# Patient Record
(demographics unavailable — no encounter records)

---

## 2025-03-16 NOTE — HISTORY OF PRESENT ILLNESS
[Current] : current [>= 20 pack years] : >= 20 pack years [TextBox_4] : 82-year-old female with history of OAD, presents for follow-up.  Patient complains of occasional productive cough without SOB, chest pain, hemoptysis, night sweats or weight loss.  She continues to smoke 1 pack daily.  She uses Breo daily with occasional albuterol MDI use. [TextBox_11] : 1 [TextBox_13] : 41 [TextBox_29] : Denies snoring, daytime somnolence, apneic episodes, AM headaches

## 2025-03-16 NOTE — REVIEW OF SYSTEMS
[Cough] : cough [Sputum] : sputum [Dyspnea] : no dyspnea [Wheezing] : no wheezing [SOB on Exertion] : no sob on exertion [Back Pain] : back pain [Negative] : Endocrine

## 2025-03-16 NOTE — DISCUSSION/SUMMARY
[FreeTextEntry1] : 82-year-old female with history of OAD at baseline.  Smoking cessation was discussed at great length again.  She is to continue use of Breo daily with albuterol as needed.  Treatment adjustment will depend on symptomatic needs.  PFTs will be repeated in the future.  She is to follow-up with her PMD as before.